# Patient Record
Sex: FEMALE | Race: OTHER
[De-identification: names, ages, dates, MRNs, and addresses within clinical notes are randomized per-mention and may not be internally consistent; named-entity substitution may affect disease eponyms.]

---

## 2017-05-09 ENCOUNTER — HOSPITAL ENCOUNTER (OUTPATIENT)
Dept: HOSPITAL 80 - FIMAGING | Age: 72
End: 2017-05-09
Attending: GENERAL ACUTE CARE HOSPITAL
Payer: COMMERCIAL

## 2017-05-09 DIAGNOSIS — Z12.31: Primary | ICD-10-CM

## 2017-05-09 DIAGNOSIS — Z92.3: ICD-10-CM

## 2017-05-09 DIAGNOSIS — Z85.3: ICD-10-CM

## 2017-05-09 PROCEDURE — G0202 SCR MAMMO BI INCL CAD: HCPCS

## 2018-04-03 ENCOUNTER — HOSPITAL ENCOUNTER (OUTPATIENT)
Dept: HOSPITAL 80 - FIMAGING | Age: 73
End: 2018-04-03
Attending: NURSE PRACTITIONER
Payer: COMMERCIAL

## 2018-04-03 DIAGNOSIS — Z13.820: Primary | ICD-10-CM

## 2018-04-03 DIAGNOSIS — M85.89: ICD-10-CM

## 2018-06-04 ENCOUNTER — HOSPITAL ENCOUNTER (OUTPATIENT)
Dept: HOSPITAL 80 - FIMAGING | Age: 73
End: 2018-06-04
Attending: FAMILY MEDICINE
Payer: COMMERCIAL

## 2018-06-04 DIAGNOSIS — Z85.3: ICD-10-CM

## 2018-06-04 DIAGNOSIS — Z12.31: Primary | ICD-10-CM

## 2018-07-05 ENCOUNTER — HOSPITAL ENCOUNTER (OUTPATIENT)
Dept: HOSPITAL 80 - FIMAGING | Age: 73
End: 2018-07-05
Attending: FAMILY MEDICINE
Payer: COMMERCIAL

## 2018-07-05 DIAGNOSIS — Z03.89: Primary | ICD-10-CM

## 2018-07-30 ENCOUNTER — HOSPITAL ENCOUNTER (EMERGENCY)
Dept: HOSPITAL 80 - FED | Age: 73
Discharge: HOME | End: 2018-07-30
Payer: COMMERCIAL

## 2018-07-30 VITALS — DIASTOLIC BLOOD PRESSURE: 84 MMHG | SYSTOLIC BLOOD PRESSURE: 162 MMHG

## 2018-07-30 DIAGNOSIS — R51: ICD-10-CM

## 2018-07-30 DIAGNOSIS — R42: Primary | ICD-10-CM

## 2018-07-30 DIAGNOSIS — I10: ICD-10-CM

## 2018-07-30 DIAGNOSIS — H92.09: ICD-10-CM

## 2018-07-30 DIAGNOSIS — E11.9: ICD-10-CM

## 2018-07-30 NOTE — EDPHY
H & P


Stated Complaint: bilat earache/ha x 10 days/stopped abx 1 wk ago


Time Seen by Provider: 07/30/18 18:05


HPI/ROS: 





CHIEF COMPLAINT:  Otalgia, headache, vertigo





HISTORY OF PRESENT ILLNESS:  The patient presents to the ED with a 3 week 

history of bilateral otalgia.  She was treated by her primary care provider for 

otitis media.  She finished antibiotics a week ago.  She reports that she has 

had no improvement of her otalgia.  She also complains of some pruritus 

involving her external auditory canal.  The patient does have a history of 

diabetes.  She has also been experiencing a vague headache which she describes 

his primary along the vertex of her scalp.  She denies any peripheral numbness 

or weakness.  She denies any photophobia or visual changes.  The patient denies 

any history of fall or trauma.  She is not anticoagulated.





REVIEW OF SYSTEMS:


A comprehensive 10 point review of systems is otherwise negative aside from 

elements mentioned in the history of present illness.


Source: Patient





- Personal History


Current Tetanus Diphtheria and Acellular Pertussis (TDAP): Unsure


Tetanus Vaccine Date: < 10 years





- Medical/Surgical History


Hx Asthma: No


Hx Chronic Respiratory Disease: No


Hx Diabetes: Yes


Hx Cardiac Disease: No


Hx Renal Disease: No


Hx Cirrhosis: No


Hx Alcoholism: No


Hx HIV/AIDS: No


Hx Splenectomy or Spleen Trauma: No


Other PMH: HTN, DM, high cholesterol, breast cancer, lymphoma, radical right 

mastectomy, hysterectomy, tonsillectomy, C section x 2





- Social History


Smoking Status: Never smoked





- Physical Exam


Exam: 





General Appearance:  Alert, no distress


Eyes:  Pupils equal and round no pallor or injection


ENT, Mouth:  Mucous membranes moist, TMs are clear bilaterally without evidence 

of effusion or infection


Respiratory:  There are no retractions, lungs are clear to auscultation


Cardiovascular:  Regular rate and rhythm


Gastrointestinal:  Abdomen is soft and nontender, no masses, bowel sounds normal


Neurological:  A&O, normal motor function, normal sensory exam, normal cranial 

nerves


Skin:  Warm and dry, no rashes


Musculoskeletal:  Neck is supple nontender


Extremities:  symmetrical, full range of motion


Psychiatric:  Patient is oriented X 3, there is no agitation


Constitutional: 


 Initial Vital Signs











Temperature (C)  36.9 C   07/30/18 18:02


 


Heart Rate  66   07/30/18 18:02


 


Respiratory Rate  17   07/30/18 18:02


 


Blood Pressure  174/80 H  07/30/18 18:02


 


O2 Sat (%)  97   07/30/18 18:02








 











O2 Delivery Mode               Room Air














Allergies/Adverse Reactions: 


 





No Known Allergies Allergy (Verified 07/30/18 18:01)


 








Home Medications: 














 Medication  Instructions  Recorded


 


ATENOLOL  11/02/09


 


metFORMIN HCL  11/02/09














Medical Decision Making





- Diagnostics


Imaging Results: 


 Imaging Impressions





Head CT  07/30/18 18:18


Impression:


1. No significant intracranial abnormality seen.


 


If symptoms worsen, additional imaging may be necessary. 


 


Findings discussed with Dmitry Ortiz M.D.  at  18:42 hour, 7/30/2018.


 


 











ED Course/Re-evaluation: 





The patient presents to the ED with complaints of 3 weeks of bilateral ear 

pain.  She has no evidence of an obvious ear infection noted on exam.  

Additionally the patient has been experiencing some symptoms consistent with 

benign positional vertigo although she is asymptomatic while in the emergency 

department.  The patient did undergo a CT scan of her head given her age, 

complaints of headache, ear pain and vertigo.  The results of this study 

demonstrate no obvious intracranial process.





At this point time the etiology of the patient's symptoms are uncertain.  I 

have provided the patient a prescription for meclizine for her symptoms of 

vertigo.  I would like the patient to follow up with our on-call ENT physician 

for further evaluation of her symptoms.








Differential Diagnosis: 





Differential diagnosis considered includes otitis media, mastoiditis, external 

otitis media, benign positional vertigo, central vertigo





Departure





- Departure


Disposition: Home, Routine, Self-Care


Clinical Impression: 


 Vertigo, Ear pain





Condition: Good


Instructions:  Vertigo (ED)


Additional Instructions: 


1.  Meclizine as needed for symptoms of vertigo and dizziness.


2. I recommend Tylenol 650 mg every 6 hr for your ear pain.


3. Please follow up with our Ear Nose Throat specialist for further evaluation 

of your ear pain.


4. I see no evidence of an obvious infection based upon your workup today.





  


Referrals: 


AZAR ZACARIAS [Primary Care Provider] - As per Instructions


Jon Spencer MD [Medical Doctor] - As per Instructions

## 2019-02-02 ENCOUNTER — HOSPITAL ENCOUNTER (OUTPATIENT)
Dept: HOSPITAL 80 - FED | Age: 74
Setting detail: OBSERVATION
LOS: 1 days | Discharge: HOME | End: 2019-02-03
Attending: FAMILY MEDICINE | Admitting: FAMILY MEDICINE
Payer: COMMERCIAL

## 2019-02-02 DIAGNOSIS — I10: ICD-10-CM

## 2019-02-02 DIAGNOSIS — Z79.84: ICD-10-CM

## 2019-02-02 DIAGNOSIS — Z85.3: ICD-10-CM

## 2019-02-02 DIAGNOSIS — E78.5: ICD-10-CM

## 2019-02-02 DIAGNOSIS — Z90.710: ICD-10-CM

## 2019-02-02 DIAGNOSIS — L03.113: Primary | ICD-10-CM

## 2019-02-02 DIAGNOSIS — E87.2: ICD-10-CM

## 2019-02-02 DIAGNOSIS — Z90.11: ICD-10-CM

## 2019-02-02 DIAGNOSIS — I97.2: ICD-10-CM

## 2019-02-02 DIAGNOSIS — E11.65: ICD-10-CM

## 2019-02-02 DIAGNOSIS — E86.9: ICD-10-CM

## 2019-02-02 LAB — PLATELET # BLD: 278 10^3/UL (ref 150–400)

## 2019-02-02 PROCEDURE — 96376 TX/PRO/DX INJ SAME DRUG ADON: CPT

## 2019-02-02 PROCEDURE — 96366 THER/PROPH/DIAG IV INF ADDON: CPT

## 2019-02-02 PROCEDURE — 96365 THER/PROPH/DIAG IV INF INIT: CPT

## 2019-02-02 PROCEDURE — G0378 HOSPITAL OBSERVATION PER HR: HCPCS

## 2019-02-02 PROCEDURE — 93971 EXTREMITY STUDY: CPT

## 2019-02-02 PROCEDURE — 96361 HYDRATE IV INFUSION ADD-ON: CPT

## 2019-02-02 PROCEDURE — 99285 EMERGENCY DEPT VISIT HI MDM: CPT

## 2019-02-02 RX ADMIN — LOSARTAN POTASSIUM SCH MG: 50 TABLET, FILM COATED ORAL at 15:36

## 2019-02-02 RX ADMIN — INSULIN LISPRO SCH: 100 INJECTION, SOLUTION INTRAVENOUS; SUBCUTANEOUS at 17:06

## 2019-02-02 NOTE — EDPHY
H & P


Time Seen by Provider: 02/02/19 10:30


HPI/ROS: 





CHIEF COMPLAINT:  Right arm swelling erythema





HISTORY OF PRESENT ILLNESS:  The patient presents the ED with 2 days of 

increased right arm swelling, erythema and pain.  The patient has a history of 

breast cancer was treated surgically with a mastectomy in 2004. The patient 

denies any chest pain or shortness of breath.  She does report global fatigue.  

She has mild discomfort throughout the right upper extremity.  She denies any 

acute numbness or weakness.  The patient denies any abdominal pain, vomiting or 

diarrhea.  She reports her symptoms are moderate in nature.  





REVIEW OF SYSTEMS:


A comprehensive 10 point review of systems is otherwise negative aside from 

elements mentioned in the history of present illness.








Source: Patient





- Personal History


Tetanus Vaccine Date: < 10 years





- Medical/Surgical History


Hx Asthma: No


Hx Chronic Respiratory Disease: No


Hx Diabetes: Yes


Hx Cardiac Disease: No


Hx Renal Disease: No


Hx Cirrhosis: No


Hx Alcoholism: No


Hx HIV/AIDS: No


Hx Splenectomy or Spleen Trauma: No


Other PMH: HTN, DM, high cholesterol, breast cancer, lymphoma, radical right 

mastectomy, hysterectomy, tonsillectomy, C section x 2





- Social History


Smoking Status: Never smoked





- Physical Exam


Exam: 





General Appearance:  Elderly female, appears uncomfortable


Eyes:  Pupils equal and round no pallor or injection


ENT, Mouth:  Dry mucous membranes


Respiratory:  There are no retractions, lungs are clear to auscultation


Cardiovascular:  Regular rate and rhythm


Gastrointestinal:  Abdomen is soft and nontender, no masses, bowel sounds normal


Neurological:  5/5 strength noted all 4 extremities


Skin:  Erythema and swelling noted to the right upper extremity


Musculoskeletal:  Neck is supple nontender


Extremities:  Swelling noted to the right upper extremity, 2+ radial and ulnar 

pulse noted








Constitutional: 


 Initial Vital Signs











Temperature (C)  36.5 C   02/02/19 10:32


 


Heart Rate  67   02/02/19 10:32


 


Respiratory Rate  18   02/02/19 10:32


 


Blood Pressure  170/62 H  02/02/19 10:32


 


O2 Sat (%)  98   02/02/19 10:32








 











O2 Delivery Mode               Room Air














Allergies/Adverse Reactions: 


 





No Known Allergies Allergy (Verified 07/30/18 18:01)


 








Home Medications: 














 Medication  Instructions  Recorded


 


ATENOLOL  11/02/09


 


metFORMIN HCL  11/02/09














Medical Decision Making





- Diagnostics


Imaging Results: 


 Imaging Impressions





Extremity Venous Study  02/02/19 11:16


Impression: No deep venous thrombosis right arm.


 


 


 


Findings and recommendations discussed with Emergency Department physician, 

Dmitry Ortiz  at  12:43 hour, 2/2/2019.


 


Final report concurs with initial preliminary interpretation.











ED Course/Re-evaluation: 





Patient presents the ED for evaluation of erythema and swelling to her right 

arm.  The patient does have lymphedema from her prior mastectomy.  She 


typically does not have symptoms of pain erythema.





The patient was noted to be neurovascularly intact upon arrival.





The patient had an IV established.  She is noted to have a normal CBC and a 

reassuring procalcitonin.





She was taken for an ultrasound of her extremity which demonstrated no evidence 

of a DVT.





The patient does have fairly significant cellulitis involving the entire upper 

extremity.





The patient will be started on IV cefazolin and will be admitted to the 

hospital for observation this evening.





Consultation was made with the hospitalist service.  The patient will be 

admitted by Dr. Quintanilla.








Differential Diagnosis: 





Differential diagnosis considered includes cellulitis, DVT, abscess, 

necrotizing fasciitis





- Data Points


Laboratory Results: 


 Laboratory Results





 02/02/19 10:55 





 02/02/19 10:55 





 











  02/02/19 02/02/19





  10:55 10:55


 


WBC  8.60 10^3/uL 10^3/uL  





   (3.80-9.50)  


 


RBC  4.67 10^6/uL 10^6/uL  





   (4.18-5.33)  


 


Hgb  13.7 g/dL g/dL  





   (12.6-16.3)  


 


Hct  42.4 % %  





   (38.0-47.0)  


 


MCV  90.8 fL fL  





   (81.5-99.8)  


 


MCH  29.3 pg pg  





   (27.9-34.1)  


 


MCHC  32.3 g/dL L g/dL  





   (32.4-36.7)  


 


RDW  13.8 % %  





   (11.5-15.2)  


 


Plt Count  278 10^3/uL 10^3/uL  





   (150-400)  


 


MPV  9.9 fL fL  





   (8.7-11.7)  


 


Neut % (Auto)  74.4 % H %  





   (39.3-74.2)  


 


Lymph % (Auto)  18.3 % %  





   (15.0-45.0)  


 


Mono % (Auto)  6.2 % %  





   (4.5-13.0)  


 


Eos % (Auto)  0.3 % L %  





   (0.6-7.6)  


 


Baso % (Auto)  0.3 % %  





   (0.3-1.7)  


 


Nucleat RBC Rel Count  0.0 % %  





   (0.0-0.2)  


 


Absolute Neuts (auto)  6.40 10^3/uL 10^3/uL  





   (1.70-6.50)  


 


Absolute Lymphs (auto)  1.57 10^3/uL 10^3/uL  





   (1.00-3.00)  


 


Absolute Monos (auto)  0.53 10^3/uL 10^3/uL  





   (0.30-0.80)  


 


Absolute Eos (auto)  0.03 10^3/uL 10^3/uL  





   (0.03-0.40)  


 


Absolute Basos (auto)  0.03 10^3/uL 10^3/uL  





   (0.02-0.10)  


 


Absolute Nucleated RBC  0.00 10^3/uL 10^3/uL  





   (0-0.01)  


 


Immature Gran %  0.5 % %  





   (0.0-1.1)  


 


Immature Gran #  0.04 10^3/uL 10^3/uL  





   (0.00-0.10)  


 


Sodium    136 mEq/L mEq/L





    (135-145) 


 


Potassium    4.5 mEq/L mEq/L





    (3.5-5.2) 


 


Chloride    107 mEq/L mEq/L





    () 


 


Carbon Dioxide    18 mEq/l L mEq/l





    (22-31) 


 


Anion Gap    11 mEq/L mEq/L





    (6-14) 


 


BUN    24 mg/dL H mg/dL





    (7-23) 


 


Creatinine    0.7 mg/dL mg/dL





    (0.6-1.0) 


 


Estimated GFR    > 60 





   


 


Glucose    313 mg/dL H mg/dL





    () 


 


Calcium    9.8 mg/dL mg/dL





    (8.5-10.4) 


 


Procalcitonin    0.07 ng/mL ng/mL





    (0.02-0.10) 











Medications Given: 


 








Discontinued Medications





Sodium Chloride (Ns)  1,000 mls @ 0 mls/hr IV ONCE ONE; Wide Open


   PRN Reason: Protocol


   Stop: 02/02/19 10:47


   Last Admin: 02/02/19 11:07 Dose:  1,000 mls


Cefazolin Sodium/Dextrose (Ancef 1 Gm (Premix))  50 mls @ 200 mls/hr IV EDNOW 

ONE


   PRN Reason: Protocol


   Stop: 02/02/19 13:13


   Last Admin: 02/02/19 13:05 Dose:  50 mls








Departure





- Departure


Disposition: [facility] Inpatient Acute


Clinical Impression: 


 Lymphedema of right arm, Cellulitis





Condition: Good


Referrals: 


AZAR ZACARIAS [Primary Care Provider] - As per Instructions

## 2019-02-02 NOTE — PDGENHP
History and Physical





- Chief Complaint


righ arm swelling, pain





- History of Present Illness


75 yo female presents with increased right arm swelling, erythema and pain.  

The patient has a history of breast cancer was treated surgically with a 

mastectomy in 2004. The patient denies any chest pain or shortness of breath.  

She has mild discomfort throughout the right upper extremity.  She denies any 

acute numbness or weakness.  The patient denies any abdominal pain, vomiting or 

diarrhea.  She reports her symptoms are moderate in nature.  She denies fever





In the ER, she is noted not to have leukocytosis. She had an US of the RUE 

which is negative for DVT


She has been started on Cefazolin IV





 PMH: HTN, DM, high cholesterol, breast cancer, lymphoma, radical right 

mastectomy, hysterectomy, tonsillectomy, C section x 2





- Social History


Smoking Status: Never smoked, social ETOH





FmHx:


non contributory





History Information





- Allergies/Home Medication List


Allergies/Adverse Reactions: 








No Known Allergies Allergy (Verified 07/30/18 18:01)


 





Home Medications: 








Atenolol [Tenormin 100 mg (*)] 50 mg PO HS 02/02/19 [Last Taken 02/01/19]


Atenolol [Tenormin 100 mg (*)] 100 mg PO DAILY 02/02/19 [Last Taken 02/02/19]


Calcium Carbonate [Oyster Shell Calcium 500 mg (*)] 500 mg PO BID@09,12 02/02/ 19 [Last Taken 02/02/19 09:00]


Cetirizine [ZyrTEC 10 mg (*)] 10 mg PO HS PRN 02/02/19 [Last Taken 02/01/19]


Cholecalciferol Vit D3 [Vitamin D3 2000 units tab (OTC)] 4,000 units PO DAILY@

12 02/02/19 [Last Taken 02/01/19]


Diclofenac Sodium [Voltaren 75 MG (*)] 75 mg PO DAILY 02/02/19 [Last Taken 02/02 /19]


Fluticasone Nasal [Flonase Nasal Spray (RX)] 2 sprays EACHNARE DAILY PRN 02/02/ 19 [Last Taken Unknown]


Herbals/Supplements -Info Only 1 ea PO DAILY 02/02/19 [Last Taken Unknown]


Ketotifen Fumarate [Allergy Eye Drops] 1 drop EACHEYE DAILY PRN 02/02/19 [Last 

Taken Unknown]


Losartan Potassium [Cozaar 50 mg (*)] 100 mg PO DAILY@13 02/02/19 [Last Taken 02 /01/19]


Multivitamins [Multivitamin (*)] 1 each PO DAILY@12 02/02/19 [Last Taken 02/01/ 19]


Vitamin B Complex [Vitamin B Complex (OTC)] 1 each PO DAILY 02/02/19 [Last 

Taken 02/02/19]


glyBURIDE [Glyburide] 2.5 mg PO DAILY@18 PRN 02/02/19 [Last Taken 02/01/19 see 

note]


metFORMIN SR [Glucophage  mg (*)] 750 mg PO BIDMEAL 02/02/19 [Last Taken 

02/02/19]





I have personally reviewed and updated: medical history, social history





- Social History


Smoking Status: Never smoked





Review of Systems


Review of Systems: 





ROS: 10pt was reviewed & negative except for what was stated in HPI & below





Physical Exam


Physical Exam: 

















Temp Pulse Resp BP Pulse Ox


 


 36.7 C   62   16   176/102 H  99 


 


 02/02/19 14:48  02/02/19 14:48  02/02/19 14:48  02/02/19 14:48  02/02/19 14:48











Constitutional: no apparent distress


Eyes: PERRL


Ears, Nose, Mouth, Throat: dry mucous membranes


Cardiovascular: regular rate and rhythym, edema (trace RUE edema)


Respiratory: no respiratory distress, no rales or rhonchi, clear to auscultation


Gastrointestinal: normoactive bowel sounds, soft, non-tender abdomen, no 

palpable masses


Skin: warm, rash, other (RUE erythema, swelling, no induration)


Neurologic: AAOx3


Psychiatric: interacting appropriately, not anxious, not encephalopathic


Lymph, Heme, Immunologic: No petechiae





Lab Data & Imaging Review





 02/02/19 10:55





 02/02/19 10:55














WBC  8.60 10^3/uL (3.80-9.50)   02/02/19  10:55    


 


RBC  4.67 10^6/uL (4.18-5.33)   02/02/19  10:55    


 


Hgb  13.7 g/dL (12.6-16.3)   02/02/19  10:55    


 


Hct  42.4 % (38.0-47.0)   02/02/19  10:55    


 


MCV  90.8 fL (81.5-99.8)   02/02/19  10:55    


 


MCH  29.3 pg (27.9-34.1)   02/02/19  10:55    


 


MCHC  32.3 g/dL (32.4-36.7)  L  02/02/19  10:55    


 


RDW  13.8 % (11.5-15.2)   02/02/19  10:55    


 


Plt Count  278 10^3/uL (150-400)   02/02/19  10:55    


 


MPV  9.9 fL (8.7-11.7)   02/02/19  10:55    


 


Neut % (Auto)  74.4 % (39.3-74.2)  H  02/02/19  10:55    


 


Lymph % (Auto)  18.3 % (15.0-45.0)   02/02/19  10:55    


 


Mono % (Auto)  6.2 % (4.5-13.0)   02/02/19  10:55    


 


Eos % (Auto)  0.3 % (0.6-7.6)  L  02/02/19  10:55    


 


Baso % (Auto)  0.3 % (0.3-1.7)   02/02/19  10:55    


 


Nucleat RBC Rel Count  0.0 % (0.0-0.2)   02/02/19  10:55    


 


Absolute Neuts (auto)  6.40 10^3/uL (1.70-6.50)   02/02/19  10:55    


 


Absolute Lymphs (auto)  1.57 10^3/uL (1.00-3.00)   02/02/19  10:55    


 


Absolute Monos (auto)  0.53 10^3/uL (0.30-0.80)   02/02/19  10:55    


 


Absolute Eos (auto)  0.03 10^3/uL (0.03-0.40)   02/02/19  10:55    


 


Absolute Basos (auto)  0.03 10^3/uL (0.02-0.10)   02/02/19  10:55    


 


Absolute Nucleated RBC  0.00 10^3/uL (0-0.01)   02/02/19  10:55    


 


Immature Gran %  0.5 % (0.0-1.1)   02/02/19  10:55    


 


Immature Gran #  0.04 10^3/uL (0.00-0.10)   02/02/19  10:55    


 


Sodium  136 mEq/L (135-145)   02/02/19  10:55    


 


Potassium  4.5 mEq/L (3.5-5.2)   02/02/19  10:55    


 


Chloride  107 mEq/L ()   02/02/19  10:55    


 


Carbon Dioxide  18 mEq/l (22-31)  L  02/02/19  10:55    


 


Anion Gap  11 mEq/L (6-14)   02/02/19  10:55    


 


BUN  24 mg/dL (7-23)  H  02/02/19  10:55    


 


Creatinine  0.7 mg/dL (0.6-1.0)   02/02/19  10:55    


 


Estimated GFR  > 60   02/02/19  10:55    


 


Glucose  313 mg/dL ()  H  02/02/19  10:55    


 


Calcium  9.8 mg/dL (8.5-10.4)   02/02/19  10:55    


 


Procalcitonin  0.07 ng/mL (0.02-0.10)   02/02/19  10:55    











Assessment & Plan


Assessment: 








Cellulitis (Acute)


Lymphedema of right arm (Acute)

## 2019-02-03 VITALS — DIASTOLIC BLOOD PRESSURE: 70 MMHG | SYSTOLIC BLOOD PRESSURE: 160 MMHG

## 2019-02-03 LAB — PLATELET # BLD: 286 10^3/UL (ref 150–400)

## 2019-02-03 RX ADMIN — LOSARTAN POTASSIUM SCH MG: 50 TABLET, FILM COATED ORAL at 12:23

## 2019-02-03 RX ADMIN — INSULIN LISPRO SCH: 100 INJECTION, SOLUTION INTRAVENOUS; SUBCUTANEOUS at 08:13

## 2019-02-03 NOTE — PDDCSUM
Discharge Summary


Discharge Summary: 





75 yo female who was admitted with RUE cellulitis in setting of chronic 

secondary lymph edema due to breast cancer and treatment. Started on IV abx 

with appropriate response. Now ready for d/c on Augmentin for an additional 7 

days. Will have f/u this week with her PCP.





BP is noted to be elevated mostly in the systolic 160's. She wishes to discuss 

further mgmt with her PCP. NO changes were made to her home regimen. Of note 

she is on Losartan 100mg daily.





DDX:


#RUE Cellulitis


#Lymphedema of right arm


#Metabolic Acidosis


#HTN, chronic


#Hx of Breast cancer


#Hyperglycemia, NIDDM





Exam:


NAD


AAOX3


RRR


CTA B


S/NT/ND


RUE: ERYTHEMA SIGNIFICANTLY IMPROVED. EDEMA IMPROVING AS WELL





MEDS: SEE MED REC





F/U: PER ABOVE





TOTAL TIME SPENT ON D/C IS 35 MINS

## 2019-02-03 NOTE — ASMTCMCOM
CM Note

 

CM Note                       

Notes:

Patient is a 74 year old female who presented to [facility] ED with increased right arm swelling, erythema 


and pain. Patient has history of breast cancer, radical right 

mastectomy, hysterectomy, HTN, DM, High Cholesterol, lymphoma, tonsillectomy, C section 

x2.  Patient admitted for acute cellulitis, acute lymphedema of right arm. Patient admitted and 

treated with IV antibiotics, will discharge home today independent and follow up with PCP Amy 

in one week. 



CM met with patient prior to discharge, patient states she will be going home with her friend who 

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 Her daughter is also a nurse and supports her from 

[place], patient states she has a lot of good friends that help her. Patient states she will be 

following up with her family doctor. CM described and delivered IM, patient signed for receipt 

(placed in back of chart). Patient to follow up as recommended, CM available to support if any 

additional CM needs arise. 

 

Date Signed:  02/03/2019 02:36 PM

Electronically Signed By:Martha Be

## 2019-02-10 ENCOUNTER — HOSPITAL ENCOUNTER (EMERGENCY)
Dept: HOSPITAL 80 - FED | Age: 74
Discharge: HOME | End: 2019-02-10
Payer: COMMERCIAL

## 2019-02-10 VITALS — DIASTOLIC BLOOD PRESSURE: 86 MMHG | SYSTOLIC BLOOD PRESSURE: 155 MMHG

## 2019-02-10 DIAGNOSIS — R19.7: Primary | ICD-10-CM

## 2019-02-10 DIAGNOSIS — R35.0: ICD-10-CM

## 2019-02-10 LAB — PLATELET # BLD: 354 10^3/UL (ref 150–400)

## 2019-02-10 NOTE — EDPHY
H & P


Stated Complaint: diarrhea


Time Seen by Provider: 02/10/19 15:55


HPI/ROS: 





CHIEF COMPLAINT:  Diarrhea





HISTORY OF PRESENT ILLNESS:  The patient presents the ED with diarrhea and 

abdominal discomfort.  The patient does have a history of recent cellulitis 

involving the right upper extremity treated as an inpatient here [facility] the [facility].  She was discharged home on Augmentin.  She did developed 

diarrhea shortly thereafter.  She was started on a different antibiotic by her 

primary care provider last week.  The patient reports that her erythema 

involving the right upper extremity has entirely resolved.  She does complain 

of crampy abdominal discomfort.  She denies any acute neurologic complaints.  

She denies additional acute complaints.





REVIEW OF SYSTEMS:


A comprehensive 10 point review of systems is otherwise negative aside from 

elements mentioned in the history of present illness.








Source: Patient


Exam Limitations: No limitations





- Personal History


Current Tetanus/Diphtheria Vaccine: Yes


Current Tetanus Diphtheria and Acellular Pertussis (TDAP): Yes


Tetanus Vaccine Date: < 10 years





- Medical/Surgical History


Hx Asthma: No


Hx Chronic Respiratory Disease: No


Hx Diabetes: Yes


Hx Cardiac Disease: Yes


Hx Renal Disease: No


Hx Cirrhosis: No


Hx Alcoholism: No


Hx HIV/AIDS: No


Hx Splenectomy or Spleen Trauma: No


Other PMH: HTN, DM, high cholesterol, breast cancer, lymphoma, radical right 

mastectomy, hysterectomy, tonsillectomy, C section x 2





- Social History


Smoking Status: Never smoked





- Physical Exam


Exam: 





General Appearance:  Alert, no distress


Eyes:  Pupils equal and round no pallor or injection


ENT, Mouth:  Mucous membranes moist


Respiratory:  There are no retractions, lungs are clear to auscultation


Cardiovascular:  Regular rate and rhythm


Gastrointestinal:  Mild tenderness, no peritoneal signs, normal bowel sounds


Neurological:  A&O, normal motor function, normal sensory exam, normal cranial 

nerves


Skin:  Warm and dry, no rashes, no cellulitic changes noted to the right upper 

extremity.  Markedly improved from my previous visit the


Musculoskeletal:  Neck is supple nontender


Extremities:  symmetrical, full range of motion








Constitutional: 


 Initial Vital Signs











Temperature (C)  36.9 C   02/10/19 15:50


 


Heart Rate  60   02/10/19 15:50


 


Respiratory Rate  16   02/10/19 15:50


 


Blood Pressure  192/93 H  02/10/19 15:50


 


O2 Sat (%)  98   02/10/19 15:50








 











O2 Delivery Mode               Room Air














Allergies/Adverse Reactions: 


 





No Known Allergies Allergy (Verified 02/10/19 15:49)


 








Home Medications: 














 Medication  Instructions  Recorded


 


Atenolol [Tenormin 100 mg (*)] 50 mg PO HS 02/02/19


 


Atenolol [Tenormin 100 mg (*)] 100 mg PO DAILY 02/02/19


 


Calcium Carbonate [Oyster Shell 500 mg PO BID@09,12 02/02/19





Calcium 500 mg (*)]  


 


Cetirizine [ZyrTEC 10 mg (*)] 10 mg PO HS PRN 02/02/19


 


Cholecalciferol Vit D3 [Vitamin D3 4,000 units PO DAILY@12 02/02/19





2000 units tab (OTC)]  


 


Diclofenac Sodium [Voltaren 75 MG 75 mg PO DAILY 02/02/19





(*)]  


 


Fluticasone Nasal [Flonase Nasal 2 sprays EACHNARE DAILY PRN 02/02/19





Spray]  


 


Herbals/Supplements -Info Only 1 ea PO DAILY 02/02/19


 


Ketotifen Fumarate [Allergy Eye 1 drop EACHEYE DAILY PRN 02/02/19





Drops]  


 


Losartan Potassium [Cozaar 50 mg 100 mg PO DAILY@13 02/02/19





(*)]  


 


Multivitamins [Multivitamin (*)] 1 each PO DAILY@12 02/02/19


 


Vitamin B Complex [Vitamin B 1 each PO DAILY 02/02/19





Complex (OTC)]  


 


glyBURIDE [Glyburide] 2.5 mg PO DAILY@18 PRN 02/02/19


 


metFORMIN SR [Glucophage  mg 750 mg PO BIDMEAL 02/02/19





(*)]  














Medical Decision Making


ED Course/Re-evaluation: 





Patient presents the ED for evaluation of abdominal pain and diarrhea after 

starting antibiotics for upper extremity cellulitis.  Patient was noted to have 

benign abdominal examination.  Workup in the emergency department demonstrates 

a completely resolved cellulitis involving the upper extremity.





Patient did have C diff testing performed which was negative.





The patient has no significant leukocytosis or metabolic derangement.





The patient will be advised to stop taking her oral antibiotics and begin 

taking Imodium and probiotics.





Patient is advised to follow up with her primary care provider for a recheck 

this week.


Differential Diagnosis: 





Differential diagnosis considered includes Clostridium difficile colitis, 

dehydration, renal failure, metabolic derangement, recurrent cellulitis





- Data Points


Laboratory Results: 


 Laboratory Results





 02/10/19 16:10 





 02/10/19 16:10 





 











  02/10/19 02/10/19 02/10/19





  16:10 16:10 15:59


 


WBC    6.25 10^3/uL 10^3/uL  





    (3.80-9.50)  


 


RBC    4.77 10^6/uL 10^6/uL  





    (4.18-5.33)  


 


Hgb    13.8 g/dL g/dL  





    (12.6-16.3)  


 


Hct    42.0 % %  





    (38.0-47.0)  


 


MCV    88.1 fL fL  





    (81.5-99.8)  


 


MCH    28.9 pg pg  





    (27.9-34.1)  


 


MCHC    32.9 g/dL g/dL  





    (32.4-36.7)  


 


RDW    13.6 % %  





    (11.5-15.2)  


 


Plt Count    354 10^3/uL 10^3/uL  





    (150-400)  


 


MPV    9.6 fL fL  





    (8.7-11.7)  


 


Neut % (Auto)    32.3 % L %  





    (39.3-74.2)  


 


Lymph % (Auto)    56.6 % H %  





    (15.0-45.0)  


 


Mono % (Auto)    8.2 % %  





    (4.5-13.0)  


 


Eos % (Auto)    1.8 % %  





    (0.6-7.6)  


 


Baso % (Auto)    0.6 % %  





    (0.3-1.7)  


 


Nucleat RBC Rel Count    0.0 % %  





    (0.0-0.2)  


 


Absolute Neuts (auto)    2.02 10^3/uL 10^3/uL  





    (1.70-6.50)  


 


Absolute Lymphs (auto)    3.54 10^3/uL H 10^3/uL  





    (1.00-3.00)  


 


Absolute Monos (auto)    0.51 10^3/uL 10^3/uL  





    (0.30-0.80)  


 


Absolute Eos (auto)    0.11 10^3/uL 10^3/uL  





    (0.03-0.40)  


 


Absolute Basos (auto)    0.04 10^3/uL 10^3/uL  





    (0.02-0.10)  


 


Absolute Nucleated RBC    0.00 10^3/uL 10^3/uL  





    (0-0.01)  


 


Immature Gran %    0.5 % %  





    (0.0-1.1)  


 


Immature Gran #    0.03 10^3/uL 10^3/uL  





    (0.00-0.10)  


 


Sodium  134 mEq/L L mEq/L    





   (135-145)   


 


Potassium  4.4 mEq/L mEq/L    





   (3.5-5.2)   


 


Chloride  103 mEq/L mEq/L    





   ()   


 


Carbon Dioxide  22 mEq/l mEq/l    





   (22-31)   


 


Anion Gap  9 mEq/L mEq/L    





   (6-14)   


 


BUN  23 mg/dL mg/dL    





   (7-23)   


 


Creatinine  0.7 mg/dL mg/dL    





   (0.6-1.0)   


 


Estimated GFR  > 60     





    


 


Glucose  126 mg/dL H mg/dL    





   ()   


 


Calcium  9.9 mg/dL mg/dL    





   (8.5-10.4)   


 


C. difficile Tox (PCR)      NEGATIVE 





     (NEGATIVE) 














Departure





- Departure


Disposition: Home, Routine, Self-Care


Clinical Impression: 


 Diarrhea





Condition: Good


Instructions:  Acute Diarrhea (ED)


Additional Instructions: 


1.  Please return to the ED for worsening pain, fever, vomiting or other 

concerns.


2. I recommend stopping antibiotics at this point time as your upper extremity 

infection has resolved.


3. Please follow-up with your primary care provider as directed.


4. Please begin Imodium as needed for management of your diarrhea.





  


Referrals: 


MANAN NIETO [Medical Doctor] - As per Instructions

## 2019-06-13 ENCOUNTER — HOSPITAL ENCOUNTER (OUTPATIENT)
Dept: HOSPITAL 80 - FIMAGING | Age: 74
End: 2019-06-13
Payer: COMMERCIAL